# Patient Record
Sex: MALE | Race: WHITE | NOT HISPANIC OR LATINO | URBAN - METROPOLITAN AREA
[De-identification: names, ages, dates, MRNs, and addresses within clinical notes are randomized per-mention and may not be internally consistent; named-entity substitution may affect disease eponyms.]

---

## 2018-03-19 ENCOUNTER — EMERGENCY (EMERGENCY)
Facility: HOSPITAL | Age: 53
LOS: 0 days | Discharge: HOME | End: 2018-03-20
Attending: EMERGENCY MEDICINE

## 2018-03-19 VITALS
WEIGHT: 240.08 LBS | TEMPERATURE: 97 F | SYSTOLIC BLOOD PRESSURE: 136 MMHG | HEIGHT: 72 IN | OXYGEN SATURATION: 96 % | DIASTOLIC BLOOD PRESSURE: 74 MMHG | HEART RATE: 97 BPM | RESPIRATION RATE: 20 BRPM

## 2018-03-19 DIAGNOSIS — M25.562 PAIN IN LEFT KNEE: ICD-10-CM

## 2018-03-19 DIAGNOSIS — Z88.0 ALLERGY STATUS TO PENICILLIN: ICD-10-CM
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
## 2018-03-19 DIAGNOSIS — Z79.899 OTHER LONG TERM (CURRENT) DRUG THERAPY: ICD-10-CM
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
## 2018-03-19 RX ORDER — IBUPROFEN 200 MG
800 TABLET ORAL ONCE
Qty: 0 | Refills: 0 | Status: COMPLETED | OUTPATIENT
Start: 2018-03-19 | End: 2018-03-19

## 2018-03-19 RX ADMIN — Medication 800 MILLIGRAM(S): at 23:19

## 2018-03-20 VITALS
OXYGEN SATURATION: 96 % | SYSTOLIC BLOOD PRESSURE: 130 MMHG | DIASTOLIC BLOOD PRESSURE: 72 MMHG | TEMPERATURE: 97 F | HEART RATE: 95 BPM | RESPIRATION RATE: 18 BRPM

## 2018-03-20 RX ORDER — IBUPROFEN 200 MG
1 TABLET ORAL
Qty: 32 | Refills: 0 | OUTPATIENT
Start: 2018-03-20 | End: 2018-03-27

## 2018-03-20 NOTE — ED PROVIDER NOTE - MEDICAL DECISION MAKING DETAILS
we obtained xrays, I offered to place patient in knee immobilizer and crutches with follow up to ortho in the am however, the patient states "Those things don't do shit", "I want it wrapped and I want to go to sleep". I offered follow up but states "I wont go to that [facility] office".  I will provide alternate follow up.

## 2018-03-20 NOTE — ED PROVIDER NOTE - OBJECTIVE STATEMENT
Patient is a 51 yo m who presents with left sided knee pain that hit his knee after slipping on the step of a ladder. it is moderate and throbbing in nature worse with movement and walking.  He denies any fevers or chills.

## 2018-03-20 NOTE — ED PROVIDER NOTE - MUSCULOSKELETAL MINIMAL EXAM
RANGE OF MOTION LIMITED/Patient has swelling about the medial aspect of the left knee, pedal pulses 2 +=, he is able to ambulate he can flex his quadriceps muscle he is able to ambulate,  - anterior posterior drawer sign.

## 2021-06-18 NOTE — ED ADULT TRIAGE NOTE - WEIGHT IN LBS
From: Tan Cox  To: Sher Salinas  Sent: 6/18/2021 4:09 PM CDT  Subject: Jo Ann Seymour Dr.    So sorry. After the numb wore off I realized that the spot you looked at wasn't the one I came in for. Don't know how I didn't realize it. I called and talked to the nurse and said I'd send a picture. Here's a picture of the spot. Don't know if I need to come in again or not.     Mitchell, [birth year] 240

## 2024-04-10 NOTE — ED ADULT TRIAGE NOTE - CCCP TRG CHIEF CMPLNT
knee pain/injury [Maximal Pain Intensity: 0/10] : 0/10 [90: Able to carry normal activity; minor signs or symptoms of disease.] : 90: Able to carry normal activity; minor signs or symptoms of disease.